# Patient Record
Sex: FEMALE | Race: WHITE
[De-identification: names, ages, dates, MRNs, and addresses within clinical notes are randomized per-mention and may not be internally consistent; named-entity substitution may affect disease eponyms.]

---

## 2017-12-18 NOTE — PCM.PREANE
Preanesthetic Assessment





- Procedure


Proposed Procedure: 





LISA 





- Anesthesia/Transfusion/Family Hx


Anesthesia History: Prior Anesthesia Without Reaction


Family History of Anesthesia Reaction: No


Transfusion History: No Prior Transfusion(s)


Intubation History: Unknown





- Review of Systems


General: No Symptoms


Pulmonary: No Symptoms


Cardiovascular: No Symptoms


Gastrointestinal: Other (GERD)


Neurological: No Symptoms


Other: Reports: None





- Physical Assessment


NPO Status Date: 12/18/17


NPO Status Time: 14:30


Respiratory Rate: 18


Vital Signs: 





 Last Vital Signs











Temp  36.7 C   12/18/17 13:30


 


Pulse  92   12/18/17 13:30


 


Resp  18   12/18/17 13:30


 


BP  120/77   12/18/17 13:30


 


Pulse Ox      











Height: 1.55 m


Weight: 89.176 kg


ASA Class: 2


Mental Status: Alert & Oriented x3


Airway Class: Mallampati = 1


Dentition: Reports: Normal Dentition


Thyro-Mental Finger Breadths: 3


ROM/Head Extension: Full


Lungs: Clear to Auscultation, Normal Respiratory Effort


Cardiovascular: Regular Rate, Regular Rhythm





- Allergies


Allergies/Adverse Reactions: 


 Allergies











Allergy/AdvReac Type Severity Reaction Status Date / Time


 


No Known Allergies Allergy   Verified 09/29/17 19:31














- Blood


Blood Available: No


Product(s) Available: None





- Anesthesia Plan


Pre-Op Medication Ordered: None





- Acknowledgements


Anesthesia Type Planned: Epidural


Pt an Appropriate Candidate for the Planned Anesthesia: Yes


Alternatives and Risks of Anesthesia Discussed w Pt/Guardian: Yes


Pt/Guardian Understands and Agrees with Anesthesia Plan: Yes





PreAnesthesia Questionnaire


Other HEENT History: wears glasses


OB/GYN History: Reports: Pregnancy





- Past Surgical History


HEENT Surgical History: Reports: Oral Surgery, Tonsillectomy





- SUBSTANCE USE


Smoking Status *Q: Never Smoker


Second Hand Smoke Exposure: No


Recreational Drug Use History: No





- HOME MEDS


Home Medications: 


 Home Meds





PNV95/Ferrous Fumarate/FA [Prenatal Tablet] 1 tab PO DAILY 12/18/17 [History]











- CURRENT (IN HOUSE) MEDS


Current Meds: 





 Current Medications





Lactated Ringer's (Ringers, Lactated)  1,000 mls @ 100 mls/hr IV ASDIRECTED DAGO


Oxytocin/Lactated Ringer's (Pitocin In Lr 10 Units/1,000 Ml)  10 unit in 1,000 

mls @ 500 mls/hr IV .CONTINUOUS DAGO


Nalbuphine HCl (Nubain)  10 mg IVPUSH Q2H PRN


   PRN Reason: Pain (moderate 4-6)


Ondansetron HCl (Zofran)  4 mg IVPUSH Q4H PRN


   PRN Reason: Nausea/Vomiting


Sodium Chloride (Saline Flush)  10 ml FLUSH ASDIRECTED PRN


   PRN Reason: Keep Vein Open

## 2017-12-19 NOTE — PCM.SN
- Free Text/Narrative


Note: 





Pushing since noon.


Significant external swelling.


Minimal desent in last 1 hour.


Proceed with .

## 2017-12-19 NOTE — PCM.PNLD
Labor Progress Note





- VS & Meds


Vital Signs: 


 Last Vital Signs











Temp  36.7 C   12/18/17 13:30


 


Pulse  92   12/18/17 13:30


 


Resp  18   12/18/17 14:48


 


BP  120/77   12/18/17 13:30


 


Pulse Ox      











Active Medications: 


 Current Medications





Diphenhydramine HCl (Benadryl)  25 mg IVPUSH Q6H PRN


   PRN Reason: Pruritis


Ephedrine Sulfate (Ephedrine Sulfate)  5 mg IVPUSH ASDIRECTED PRN


   PRN Reason: Hypotension


Fentanyl (Sublimaze)  100 mcg EPIDUR Q3H PRN


   PRN Reason: Pain


   Last Admin: 12/18/17 16:51 Dose:  100 mcg


Fentanyl/Bupivacaine HCl (Fentanyl/Bupivacaine/Ns 2 Mcg-0.125% 100 Ml)  100 ml 

EPIDUR ASDIRECTED DAGO


   Last Admin: 12/19/17 08:15 Dose:  100 ml


Lactated Ringer's (Ringers, Lactated)  1,000 mls @ 100 mls/hr IV ASDIRECTED DAGO


   Last Admin: 12/18/17 17:55 Dose:  999 mls/hr


Oxytocin/Lactated Ringer's (Pitocin In Lr 10 Units/1,000 Ml)  10 unit in 1,000 

mls @ 500 mls/hr IV .CONTINUOUS DAGO


Oxytocin 10 unit/ Lactated (Ringer's)  1,001 mls @ 12.01 mls/hr IV TITRATE DAGO; 

2 MUNITS/MIN


   PRN Reason: Protocol


   Last Titration: 12/19/17 13:01 Dose:  10 munits/min, 60.06 mls/hr


Nalbuphine HCl (Nubain)  10 mg IVPUSH Q2H PRN


   PRN Reason: Pain (moderate 4-6)


Ondansetron HCl (Zofran)  4 mg IVPUSH Q4H PRN


   PRN Reason: Nausea/Vomiting


Ondansetron HCl (Zofran)  4 mg IVPUSH ONETIME PRN


   PRN Reason: Nausea/Vomiting


Sodium Chloride (Saline Flush)  10 ml FLUSH ASDIRECTED PRN


   PRN Reason: Keep Vein Open











- Uterine Contractions


Contraction Intensity: Moderate





- Fetal Monitoring


Fetal Monitor Mode: External Ultrasound


Fetal Heart Rate (FHR) Variability: Moderate (6-25 bmp)


Fetal Strip Review: Category I





- Vaginal Exam


Dilation (cm): 4


Effacement (Percent): 50


Station: -3


Cervical Position: Midposition





- Labor Progress (Free Text)


Labor Progress: 





Term labor. Progressing somewhat slowly. Continue to augment as staffing allows.

## 2017-12-19 NOTE — PCM.OPNOTE
- General Post-Op/Procedure Note


Date of Surgery/Procedure: 17


Operative Procedure(s): primary 


Findings: 





viable male, 9/9 APGARS, 7#5OZ at 1602


Pre Op Diagnosis: failure to descend


Post-Op Diagnosis: Same


Anesthesia Technique: Epidural


Primary Surgeon: Pauline Westfall


Anesthesia Provider: Gilda Khan


Assistant: Nilam Woodruff


Fluid Replacement, Intraop: 800


Output, Urine Amount: 25


EBL in mLs: 900


Complications: None


Condition: Good


Free Text/Narrative:: 


 Intake & Output











 17





 06:59 14:59 22:59


 


Intake Total   1500


 


Balance   1500








The patient was taken to the operating room where epidural anesthesia was dosed 

to surgical levels without difficulty. The patient was prepped and draped in 

the usual sterile fashion in the dorsal supine position with a leftward tilt. A 

Pfannenstiel skin incision was made with the scalpel and carried through to the 

underlying layer of fascia. The fascia was incised in the midline and extended 

laterally using Gatica scissors. Kocher clamps were used to elevate the superior 

aspect of the fascial incision, which was elevated, and the underlying rectus 

muscles were dissected off bluntly and using Gatica scissors. Attention was then 

turned to the inferior aspect of the fascial incision, which in similar fashion 

was grasped with Kocher clamps, elevated, and the underlying rectus muscles 

were dissected off bluntly and using the gatica. The rectus muscles were 

dissected in the midline.





The peritoneum was entered bluntly; this incision was extended superiorly and 

inferiorly with good visualization of the bladder. The bladder blade was 

inserted. The vesicouterine peritoneum was identified and entered sharply using 

Metzenbaum scissors. This incision was extended laterally and the bladder flap 

was created digitally. The bladder blade was reinserted. The lower uterine 

segment was incised in a transverse fashion using the scalpel and with digital 

traction.





Clear fluid was noted. The infant was subsequently delivered by flexing the 

head to the incision. Body and shoulders followed without difficulty.  The cord 

was clamped and cut. The infant was subsequently handed to the awaiting 

pediatrician whose presence had been requested.. The placenta was delivered 

spontaneously intact with a three-vessel cord noted. The uterus was 

exteriorized and cleared of all clots and debris. The uterine incision was 

repaired in 2 layers using 0 monocryl. Hemostasis was visualized.  Hemostasis 

was visualized bilaterally. The uterus was returned to the abdomen. The uterine 

incision was reexamined and it was noted to be hemostatic. The pelvis was 

copiously irrigated. The fascia was closed with 1 PDS suture,  and the skin was 

closed with 3-0 monocryl. Sponge, lap, and instrument counts were correct x2. 

The patient was stable at the completion of the procedure and was subsequently 

transferred to the recovery room in stable condition.

## 2017-12-19 NOTE — PCM.POSTAN
POST ANESTHESIA ASSESSMENT





- MENTAL STATUS


Mental Status: Alert





- VITAL SIGNS


Pulse Rate: 88


SaO2: 97


Resp Rate: 20


Blood Pressure: 99/51


Temperature: 100.3 C





- RESPIRATORY


Respiratory Status: Respiratory Rate WNL, Airway Patent, O2 Saturation Stable





- CARDIOVASCULAR


CV Status: Pulse Rate WNL, Blood Pressure Stable





- GASTROINTESTINAL


GI Status: No Symptoms





- POST OP HYDRATION


Hydration Status: Adequate & Stable

## 2017-12-19 NOTE — PCM.LDHP
L&D History of Present Illness





- General


Date of Service: 17


Admit Problem/Dx: 


 Patient Status Order with Admit Dx/Problem





17 14:31


Patient Status [ADT] Routine 








 Admission Diagnosis/Problem











Admission Diagnosis/Problem    Pregnancy














Source of Information: Patient





- History of Present Illness


Introduction:: 





27 year old  female at 40 1/7 here with SROM


Pain Score: 10


Improves with: Reports: None


Worsens with: Reports: None


Associated Symptoms: Reports: N





- Related Data


Allergies/Adverse Reactions: 


 Allergies











Allergy/AdvReac Type Severity Reaction Status Date / Time


 


No Known Allergies Allergy   Verified 17 19:31











Home Medications: 


 Home Meds





PNV95/Ferrous Fumarate/FA [Prenatal Tablet] 1 tab PO DAILY 17 [History]











Past Medical History


Other HEENT History: wears glasses


OB/GYN History: Reports: Pregnancy





- Past Surgical History


HEENT Surgical History: Reports: Oral Surgery, Tonsillectomy





Social & Family History





- Family History


Family Medical History: Noncontributory





- Tobacco Use


Smoking Status *Q: Never Smoker


Second Hand Smoke Exposure: No





- Recreational Drug Use


Recreational Drug Use: No





H&P Review of Systems





- Review of Systems:


Review Of Systems: See Below


General: Reports: No Symptoms


HEENT: Reports: No Symptoms


Pulmonary: Reports: No Symptoms


Cardiovascular: Reports: No Symptoms


Gastrointestinal: Reports: No Symptoms


Genitourinary: Reports: No Symptoms


Musculoskeletal: Reports: No Symptoms


Skin: Reports: No Symptoms


Psychiatric: Reports: No Symptoms


Neurological: Reports: No Symptoms


Hematologic/Lymphatic: Reports: No Symptoms


Immunologic: Reports: No Symptoms





L&D Exam





- Exam


Exam: See Below





- Vital Signs


Vital Signs: 


 Last Vital Signs











Temp  36.7 C   17 13:30


 


Pulse  92   17 13:30


 


Resp  18   17 14:48


 


BP  120/77   17 13:30


 


Pulse Ox      











Weight: 89.176 kg





- OB Specific


Contraction Intensity: Moderate


Fetal Movement: Active


Fetal Heart Rate (FHR) Variability: Moderate (6-25 bmp)


Birth Presentation: Vertex





- Rapp Score


Rapp Score Cervix Position: Midposition


Rapp Score Consistency: Soft


Rapp Score Effacement: 51-70%


Rapp Score Dilation: 3-4 cm


Rapp Score Infant's Station: -2


Rapp Score Total: 8





- Exam


General: Alert, Oriented


HEENT: PERRLA, Conjunctiva Clear, EACs Clear, EOMI, Hearing Intact, Mucosa 

Moist & Pink, Nares Patent, Normal Nasal Septum, Posterior Pharynx Clear, TMs 

Clear


Neck: Supple, Trachea Midline


Lungs: Clear to Auscultation, Normal Respiratory Effort


Cardiovascular: Regular Rate, Regular Rhythm


GI/Abdominal Exam: Normal Bowel Sounds, Soft, Non-Tender, No Organomegaly, No 

Distention, No Abnormal Bruit, No Mass, Pelvis Stable


Rectal Exam: Normal Exam, Normal Rectal Tone


Genitourinary: Normal external exam, Normal bimanual exam, Normal speculum exam


Back Exam: Normal Inspection, Full Range of Motion


Extremities: Normal Inspection, Normal Range of Motion, Non-Tender, No Pedal 

Edema, Normal Capillary Refill


Skin: Warm, Dry, Intact


Neurological: Cranial Nerves Intact, Reflexes Equal Bilateral


Psychiatric: Alert, Normal Affect, Normal Mood





- Patient Data


Lab Results Last 24 hrs: 


 Laboratory Results - last 24 hr











  17 Range/Units





  15:00 


 


WBC  15.64 H  (3.98-10.04)  K/mm3


 


RBC  4.49  (3.98-5.22)  M/mm3


 


Hgb  13.3  (11.2-15.7)  gm/L


 


Hct  39.2  (34.1-44.9)  %


 


MCV  87.3  (79.4-94.8)  fl


 


MCH  29.6  (25.6-32.2)  pg


 


MCHC  33.9  (32.2-35.5)  g/dl


 


RDW Std Deviation  43.6  (36.4-46.3)  fL


 


Plt Count  281  (182-369)  K/mm3


 


MPV  9.9  (9.4-12.3)  fl


 


Neut % (Auto)  78.6 H  (34.0-71.1)  %


 


Lymph % (Auto)  12.4 L  (19.3-51.7)  %


 


Mono % (Auto)  6.5  (4.7-12.5)  %


 


Eos % (Auto)  2.0  (0.7-5.8)  


 


Baso % (Auto)  0.1  (0.1-1.2)  %


 


Neut # (Auto)  12.28 H  (1.56-6.13)  K/mm3


 


Lymph # (Auto)  1.94  (1.18-3.74)  K/mm3


 


Mono # (Auto)  1.02 H  (0.24-0.36)  K/mm3


 


Eos # (Auto)  0.31  (0.04-0.36)  K/mm3


 


Baso # (Auto)  0.02  (0.01-0.08)  K/mm3











Result Diagrams: 


 17 15:00





Problem List Initiated/Reviewed/Updated: Yes


Orders Last 24hrs: 


 Active Orders 24 hr











 Category Date Time Status


 


 Patient Status [ADT] Routine ADT  17 14:31 Active


 


 Activity as Tolerated [RC] PFP Care  17 14:31 Active


 


 Communication Order [RC] ASDIRECTED Care  17 14:31 Active


 


 Fetal Heart Tones [RC] ASDIRECTED Care  17 14:32 Active


 


 Notify Provider [RC] PFP Care  17 14:31 Active


 


 Notify Provider [RC] PRN Care  17 14:31 Active


 


 Peripheral IV Care [RC] .AS DIRECTED Care  17 14:32 Active


 


 Pump Management, Intrathecal [RC] ASDIRECTED Care  17 14:31 Active


 


 Urinary Catheter Assessment [RC] ASDIRECTED Care  17 14:31 Active


 


 Vital Signs [RC] PER UNIT ROUTINE Care  17 14:31 Active


 


 Bupivacaine/fentaNYL/NS [fentaNYL/Bupivacaine/NS 2 MCG- Med  17 15:30 

Active





 0.125% 100 ML]   





 100 ml EPIDUR ASDIRECTED   


 


 Lactated Ringers [Ringers, Lactated] 1,000 ml Med  17 14:45 Active





 IV ASDIRECTED   


 


 Nalbuphine [Nubain] Med  17 14:31 Active





 10 mg IVPUSH Q2H PRN   


 


 Ondansetron [Zofran] Med  17 15:21 Active





 4 mg IVPUSH ONETIME PRN   


 


 Ondansetron [Zofran] Med  17 14:31 Active





 4 mg IVPUSH Q4H PRN   


 


 Oxytocin [Pitocin] 10 unit Med  17 19:15 Active





 Lactated Ringers [Ringers, Lactated] 1,000 ml   





 IV TITRATE   


 


 Oxytocin/Lactated Ringers [Pitocin in LR 10 Units/1,000 Med  17 14:45 

Active





 ML]   





 10 unit in 1,000 ml IV .CONTINUOUS   


 


 Sodium Chloride 0.9% [Saline Flush] Med  17 14:31 Active





 10 ml FLUSH ASDIRECTED PRN   


 


 diphenhydrAMINE [Benadryl] Med  17 15:21 Active





 25 mg IVPUSH Q6H PRN   


 


 ePHEDrine [ePHEDrine Sulfate] Med  17 15:21 Active





 5 mg IVPUSH ASDIRECTED PRN   


 


 fentaNYL [Sublimaze] Med  17 15:21 Active





 100 mcg EPIDUR Q3H PRN   


 


 Electronic Fetal Heart Tones Ext w TOCO [WOMSER] Oth  17 14:31 Ordered





 Routine   


 


 Electronic Fetal Heart Tones Internal [WOMSER] Per Unit Oth  17 14:31 

Ordered





 Routine   


 


 Peripheral IV Insertion Adult [OM.PC] Routine Ot  17 14:31 Ordered


 


 Resuscitation Status Routine Resus Stat  17 14:31 Ordered








 Medication Orders





Diphenhydramine HCl (Benadryl)  25 mg IVPUSH Q6H PRN


   PRN Reason: Pruritis


Ephedrine Sulfate (Ephedrine Sulfate)  5 mg IVPUSH ASDIRECTED PRN


   PRN Reason: Hypotension


Fentanyl (Sublimaze)  100 mcg EPIDUR Q3H PRN


   PRN Reason: Pain


   Last Admin: 17 16:51  Dose: 100 mcg


Fentanyl/Bupivacaine HCl (Fentanyl/Bupivacaine/Ns 2 Mcg-0.125% 100 Ml)  100 ml 

EPIDUR ASDIRECTED [facility]


   Last Admin: 17 08:15  Dose: 100 ml


   Admin: 17 22:54  Dose: 100 ml


   Admin: 17 16:51  Dose: 100 ml


Lactated Ringer's (Ringers, Lactated)  1,000 mls @ 100 mls/hr IV ASDIRECTED [facility]


   Last Admin: 17 17:55  Dose: 999 mls/hr


   Infusion: 17 17:39  Dose: 999 mls/hr


   Admin: 17 16:38  Dose: 999 mls/hr


   Infusion: 17 16:38  Dose: 999 mls/hr


   Admin: 17 15:56  Dose: 999 mls/hr


Oxytocin/Lactated Ringer's (Pitocin In Lr 10 Units/1,000 Ml)  10 unit in 1,000 

mls @ 500 mls/hr IV .CONTINUOUS DAGO


Oxytocin 10 unit/ Lactated (Ringer's)  1,001 mls @ 12.01 mls/hr IV TITRATE DAGO; 

2 MUNITS/MIN


   PRN Reason: Protocol


   Last Titration: 17 13:01  Dose: 10 munits/min, 60.06 mls/hr


   Titration: 17 11:13  Dose: 9 munits/min, 54.05 mls/hr


   Titration: 17 09:37  Dose: 7 munits/min, 42.04 mls/hr


   Titration: 17 08:16  Dose: 5 munits/min, 30.03 mls/hr


   Titration: 17 19:53  Dose: 4 munits/min, 24.02 mls/hr


   Admin: 17 19:00  Dose: 2 munits/min, 12.01 mls/hr


Nalbuphine HCl (Nubain)  10 mg IVPUSH Q2H PRN


   PRN Reason: Pain (moderate 4-6)


Ondansetron HCl (Zofran)  4 mg IVPUSH Q4H PRN


   PRN Reason: Nausea/Vomiting


Ondansetron HCl (Zofran)  4 mg IVPUSH ONETIME PRN


   PRN Reason: Nausea/Vomiting


Sodium Chloride (Saline Flush)  10 ml FLUSH ASDIRECTED PRN


   PRN Reason: Keep Vein Open








Assessment/Plan Comment:: 





Term labor.


Augment as needed with pitocin. 


Anticipate  unless otherwise indicated.


AROM of forebag.

## 2017-12-26 NOTE — PCM.DCSUM1
**Discharge Summary





- Discharge Data


Discharge Date: 17


Discharge Disposition: Home, Self-Care 01


Condition: Good





- Patient Summary/Data


Operative Procedure(s) Performed: primary 





- Patient Instructions


Diet: Usual Diet as Tolerated


Activity: No Strenuous Activities


Driving: May Drive Today


Showering/Bathing: May Shower


Wound/Incision Care: Keep Operative Site/Wound Site Clean and Dry


Notify Provider of: Fever, Increased Pain, Swelling and Redness, Drainage, 

Nausea and/or Vomiting





- Discharge Plan


Prescriptions/Med Rec: 


Acetaminophen/oxyCODONE [Percocet 325-5 MG] 1 - 2 tab PO Q4H PRN #30 tablet


 PRN Reason: Pain


Home Medications: 


 Home Meds





PNV95/Ferrous Fumarate/FA [Prenatal Tablet] 1 tab PO DAILY 17 [History]


Acetaminophen/oxyCODONE [Percocet 325-5 MG] 1 - 2 tab PO Q4H PRN #30 tablet  [Rx]


Docusate Sodium [Colace] 100 mg PO Q12H PRN  cap 17 [Rx]


Ibuprofen [IJD: Ibuprofen] 600 mg PO Q6H PRN  tablet 17 [Rx]


Lanolin [Lansinoh HPA] 1 applic TOP ASDIRECTED PRN  tube 17 [Rx]








Patient Handouts:  Home Care Instructions for Mom


Referrals: 


Pauline Westfall MD [Primary Care Provider] -  (2-3 weeks)





- General Info


Date of Service: 17


Functional Status: Reports: Pain Controlled





- Review of Systems


General: Reports: No Symptoms


HEENT: Reports: No Symptoms


Pulmonary: Reports: No Symptoms


Cardiovascular: Reports: No Symptoms


Gastrointestinal: Reports: No Symptoms


Genitourinary: Reports: No Symptoms


Musculoskeletal: Reports: No Symptoms


Skin: Reports: No Symptoms


Neurological: Reports: No Symptoms


Psychiatric: Reports: No Symptoms





- Patient Data


Vitals - Most Recent: 


 Last Vital Signs











Temp  36.9 C   17 12:07


 


Pulse  100   17 12:07


 


Resp  19   17 12:07


 


BP  117/64   17 12:07


 


Pulse Ox  95   17 12:07











Weight - Most Recent: 89.176 kg


I&O - Last 24 hours: 


 Intake & Output











 17/17





 22:59 06:59 14:59


 


Intake Total   800


 


Output Total   25


 


Balance   775











Med Orders - Current: 


 Current Medications








Discontinued Medications





Bupivacaine HCl (Marcaine 0.5%) Confirm Administered Dose 30 ml .ROUTE .STK-MED 

ONE


   Stop: 17 15:05


   Last Admin: 17 15:58 Dose:  20 ml


Bupivacaine HCl (Sensorcaine-Mpf 0.25%)  10 ml .ROUTE .STK-MED ONE


   Stop: 17 22:23


Cefazolin Sodium (Ancef) Confirm Administered Dose 2 gm .ROUTE .STK-MED ONE


   Stop: 17 15:34


Citric Acid/Sodium Citrate (Bicitra Solution) Confirm Administered Dose 30 ml 

.ROUTE .STK-MED ONE


   Stop: 17 15:27


   Last Admin: 17 15:30 Dose:  30 ml


Citric Acid/Sodium Citrate (Bicitra Solution)  30 ml PO ONETIME ONE


   Stop: 17 15:31


   Last Admin: 17 15:22 Dose:  Not Given


Diphenhydramine HCl (Benadryl)  25 mg IVPUSH Q6H PRN


   PRN Reason: Pruritis


Diphenhydramine HCl (Benadryl)  25 mg IVPUSH Q6H PRN


   PRN Reason: pruritis


Diphenhydramine HCl (Benadryl)  25 mg IVPUSH Q6H PRN


   PRN Reason: Itching or Nausea


Docusate Sodium (Colace)  100 mg PO Q12H PRN


   PRN Reason: Constipation


   Last Admin: 17 02:52 Dose:  100 mg


Emollient Ointment (Lansinoh Hpa)  0 gm TOP ASDIRECTED PRN


   PRN Reason: Sore Nipples


   Last Admin: 17 18:18 Dose:  1 applic


Ephedrine Sulfate (Ephedrine Sulfate)  5 mg IVPUSH ASDIRECTED PRN


   PRN Reason: Hypotension


Ephedrine Sulfate (Ephedrine Sulfate)  5 mg IVPUSH SEECOMMENT PRN


   PRN Reason: Other


   Last Admin: 17 21:01 Dose:  5 mg


Fentanyl (Sublimaze)  100 mcg EPIDUR Q3H PRN


   PRN Reason: Pain


   Last Admin: 17 16:51 Dose:  100 mcg


Fentanyl (Sublimaze) Confirm Administered Dose 100 mcg .ROUTE .STK-MED ONE


   Stop: 17 15:34


Fentanyl (Sublimaze)  50 mcg IVPUSH Q5M PRN


   PRN Reason: Pain


Fentanyl/Bupivacaine HCl (Fentanyl/Bupivacaine/Ns 2 Mcg-0.125% 100 Ml)  100 ml 

EPIDUR ASDIRECTED [facility]


   Last Admin: 17 08:15 Dose:  100 ml


Hydromorphone HCl (Dilaudid)  0.5 mg IVPUSH Q15M PRN


   PRN Reason: severe pain


Lactated Ringer's (Ringers, Lactated)  1,000 mls @ 100 mls/hr IV ASDIRECTED [facility]


   Last Admin: 17 21:14 Dose:  999 mls/hr


Oxytocin/Lactated Ringer's (Pitocin In Lr 10 Units/1,000 Ml)  10 unit in 1,000 

mls @ 500 mls/hr IV .CONTINUOUS [facility]


Oxytocin 10 unit/ Lactated (Ringer's)  1,001 mls @ 12.01 mls/hr IV TITRATE DAGO; 

2 MUNITS/MIN


   PRN Reason: Protocol


   Last Titration: 17 13:01 Dose:  10 munits/min, 60.06 mls/hr


Lactated Ringer's (Ringers, Lactated) Confirm Administered Dose 1,000 mls @ as 

directed .ROUTE .[facility] ONE


   Stop: 17 15:34


Cefazolin Sodium/Dextrose 2 gm (/ Premix)  50 mls @ 100 mls/hr IV ONETIME ONE


   Stop: 17 15:59


   Last Admin: 17 15:21 Dose:  Not Given


Oxytocin/Lactated Ringer's (Pitocin In Lr 10 Units/1,000 Ml)  10 unit in 1,000 

mls @ 100 mls/hr IV ASDIRECTED [facility]


   PRN Reason: Protocol


Phenylephrine HCl 1 mg/ Sodium (Chloride)  10.1 mls @ 1 mls/sec IV TITRATE DAGO


   PRN Reason: Protocol


Dextrose/Lactated Ringer's (Dextrose 5%-Lactated Ringers)  1,000 mls @ 125 mls/

hr IV ASDIRECTED [facility]


   Stop: 17 01:21


   Last Admin: 17 18:03 Dose:  125 mls/hr


Ibuprofen (Motrin)  600 mg PO Q6H PRN


   PRN Reason: mild pain or fever


   Last Admin: 17 11:03 Dose:  600 mg


Ketorolac Tromethamine (Toradol) Confirm Administered Dose 30 mg .ROUTE .STK-

MED ONE


   Stop: 17 15:34


Ketorolac Tromethamine (Toradol)  30 mg IVPUSH Q6H [facility]


   Stop: 17 05:23


   Last Admin: 17 15:20 Dose:  Not Given


Ketorolac Tromethamine (Toradol)  30 mg IVPUSH Q6H [facility]


   Stop: 17 10:01


   Last Admin: 17 11:52 Dose:  Not Given


Lidocaine/Epinephrine (Xylocaine-Mpf 2%-Epi 1:200,000) Confirm Administered 

Dose 20 ml .ROUTE .STK-MED ONE


   Stop: 17 15:34


Meperidine HCl (Demerol)  12.5 mg IVPUSH ONETIME PRN


   PRN Reason: shivering


Metoclopramide HCl (Reglan) Confirm Administered Dose 10 mg .ROUTE .STK-MED ONE


   Stop: 17 15:27


   Last Admin: 17 15:30 Dose:  10 mg


Metoclopramide HCl (Reglan)  10 mg IVPUSH ONETIME ONE


   Stop: 17 15:31


   Last Admin: 17 15:22 Dose:  Not Given


Morphine Sulfate (Duramorph Pf) Confirm Administered Dose 1 mg .ROUTE .STK-MED 

ONE


   Stop: 17 15:05


Nalbuphine HCl (Nubain)  10 mg IVPUSH Q2H PRN


   PRN Reason: Pain (moderate 4-6)


Naloxone HCl (Narcan)  0.1 mg IVPUSH SEECOMMENT PRN


   PRN Reason: Respiratory Depression


Ondansetron HCl (Zofran)  4 mg IVPUSH Q4H PRN


   PRN Reason: Nausea/Vomiting


Ondansetron HCl (Zofran)  4 mg IVPUSH ONETIME PRN


   PRN Reason: Nausea/Vomiting


Ondansetron HCl (Zofran) Confirm Administered Dose 4 mg .ROUTE .STK-MED ONE


   Stop: 17 15:34


Ondansetron HCl (Zofran)  4 mg IVPUSH ONETIME PRN


   PRN Reason: Nausea/Vomiting


Oxycodone/Acetaminophen (Percocet 325-5 Mg)  1 - 2 tab PO Q4H PRN


   PRN Reason: Pain


   Last Admin: 17 11:56 Dose:  2 tab


Oxytocin (Pitocin) Confirm Administered Dose 10 unit .ROUTE .STK-MED ONE


   Stop: 17 15:34


Phenylephrine HCl (Leeroy-Synephrine) Confirm Administered Dose 10 mg .ROUTE .STK-

MED ONE


   Stop: 17 15:34


Simethicone (Simethicone)  80 mg PO Q4H PRN


   PRN Reason: gas pains


   Last Admin: 17 11:45 Dose:  80 mg


Sodium Chloride (Saline Flush)  10 ml FLUSH ASDIRECTED PRN


   PRN Reason: Keep Vein Open











- Exam


General: Reports: Alert, Oriented


HEENT: Reports: Pupils Equal, Pupils Reactive, EOMI, Mucous Membr. Moist/Pink


Neck: Reports: Supple


Lungs: Reports: Clear to Auscultation, Normal Respiratory Effort


Cardiovascular: Reports: Regular Rate, Regular Rhythm


GI/Abdominal Exam: Normal Bowel Sounds, Soft, Non-Tender, No Organomegaly, No 

Distention, No Abnormal Bruit, No Mass, Pelvis Stable


 (Female) Exam: Normal External Exam, Normal Speculum Exam, Normal Bimanual 

Exam


Back Exam: Reports: Normal Inspection, Full Range of Motion


Extremities: Normal Inspection, Normal Range of Motion, Non-Tender, No Pedal 

Edema, Normal Capillary Refill


Skin: Reports: Warm, Dry, Intact


Wound/Incisions: Reports: Healing Well


Neurological: Reports: No New Focal Deficit


Psy/Mental Status: Reports: Alert, Normal Affect, Normal Mood





*Q Meaningful Use (DIS)





- VTE *Q


VTE Criteria *Q: 








- Stroke *Q


Stroke Criteria *Q: 








- AMI *Q


AMI Criteria *Q:

## 2020-03-17 ENCOUNTER — HOSPITAL ENCOUNTER (INPATIENT)
Dept: HOSPITAL 41 - JD.OB | Age: 30
LOS: 1 days | Discharge: HOME | DRG: 540 | End: 2020-03-18
Attending: OBSTETRICS & GYNECOLOGY | Admitting: OBSTETRICS & GYNECOLOGY
Payer: COMMERCIAL

## 2020-03-17 DIAGNOSIS — Z79.899: ICD-10-CM

## 2020-03-17 DIAGNOSIS — O34.211: Primary | ICD-10-CM

## 2020-03-17 DIAGNOSIS — Z3A.38: ICD-10-CM

## 2020-03-17 DIAGNOSIS — Z90.89: ICD-10-CM

## 2020-03-17 DIAGNOSIS — Z91.09: ICD-10-CM

## 2020-03-17 RX ADMIN — OXYCODONE HYDROCHLORIDE AND ACETAMINOPHEN PRN TAB: 5; 325 TABLET ORAL at 21:17

## 2020-03-17 RX ADMIN — OXYCODONE HYDROCHLORIDE AND ACETAMINOPHEN PRN TAB: 5; 325 TABLET ORAL at 13:49

## 2020-03-17 NOTE — PCM.POSTAN
POST ANESTHESIA ASSESSMENT





- MENTAL STATUS


Mental Status: Alert, Oriented





- VITAL SIGNS


Vital Signs: 


 Last Vital Signs











Temp  36.5 C   03/17/20 06:16


 


Pulse  78   03/17/20 06:16


 


Resp  14   03/17/20 06:16


 


BP  99/59 L  03/17/20 06:16


 


Pulse Ox  97   03/17/20 06:16








0840


109/44


69


16


99%


97.3F





- RESPIRATORY


Respiratory Status: Respiratory Rate WNL, Airway Patent, O2 Saturation Stable, 

Supplemental Oxygen





- CARDIOVASCULAR


CV Status: Pulse Rate WNL, Blood Pressure Stable





- GASTROINTESTINAL


GI Status: No Symptoms





- PAIN


Pain Score: 0





- POST OP HYDRATION


Hydration Status: Adequate & Stable

## 2020-03-17 NOTE — PCM.PREANE
Preanesthetic Assessment





- Anesthesia/Transfusion/Family Hx


Anesthesia History: Prior Anesthesia Without Reaction


Family History of Anesthesia Reaction: No


Transfusion History: No Prior Transfusion(s)


Intubation History: Unknown





- Review of Systems


General: No Symptoms


Pulmonary: No Symptoms


Cardiovascular: No Symptoms


Gastrointestinal: No Symptoms


Neurological: No Symptoms


Other: Reports: None





- Physical Assessment


NPO Status Date: 20


NPO Status Time: 21:30


Vital Signs: 





 Last Vital Signs











Temp  36.5 C   20 06:16


 


Pulse  78   20 06:16


 


Resp  14   20 06:16


 


BP  99/59 L  20 06:16


 


Pulse Ox  97   20 06:16











Height: 1.55 m


Weight: 78.925 kg


ASA Class: 2


Mental Status: Alert & Oriented x3


Airway Class: Mallampati = 1


Dentition: Reports: Normal Dentition


Thyro-Mental Finger Breadths: 3


Mouth Opening Finger Breadths: 3


ROM/Head Extension: Full


Lungs: Clear to Auscultation, Normal Respiratory Effort


Cardiovascular: Regular Rate, Regular Rhythm





- Lab


Values: 





 Laboratory Last Values











WBC  7.87 K/mm3 (3.98-10.04)   20  05:37    


 


RBC  4.23 M/mm3 (3.98-5.22)   20  05:37    


 


Hgb  11.8 gm/dl (11.2-15.7)  D 20  05:37    


 


Hct  36.9 % (34.1-44.9)   20  05:37    


 


MCV  87.2 fl (79.4-94.8)   20  05:37    


 


MCH  27.9 pg (25.6-32.2)   20  05:37    


 


MCHC  32.0 g/dl (32.2-35.5)  L  20  05:37    


 


RDW Std Deviation  43.9 fL (36.4-46.3)   20  05:37    


 


Plt Count  255 K/mm3 (182-369)   20  05:37    


 


MPV  9.6 fl (9.4-12.3)   20  05:37    


 


Neut % (Auto)  64.0 % (34.0-71.1)   20  05:37    


 


Lymph % (Auto)  26.0 % (19.3-51.7)   20  05:37    


 


Mono % (Auto)  7.1 % (4.7-12.5)   20  05:37    


 


Eos % (Auto)  2.2  (0.7-5.8)   20  05:37    


 


Baso % (Auto)  0.3 % (0.1-1.2)   20  05:37    


 


Neut # (Auto)  5.04 K/mm3 (1.56-6.13)   20  05:37    


 


Lymph # (Auto)  2.05 K/mm3 (1.18-3.74)   20  05:37    


 


Mono # (Auto)  0.56 K/mm3 (0.24-0.36)  H  20  05:37    


 


Eos # (Auto)  0.17 K/mm3 (0.04-0.36)   20  05:37    


 


Baso # (Auto)  0.02 K/mm3 (0.01-0.08)   20  05:37    














- Allergies


Allergies/Adverse Reactions: 


 Allergies











Allergy/AdvReac Type Severity Reaction Status Date / Time


 


No Known Allergies Allergy   Verified 17 19:31














- Acknowledgements


Anesthesia Type Planned: Spinal


Pt an Appropriate Candidate for the Planned Anesthesia: Yes


Alternatives and Risks of Anesthesia Discussed w Pt/Guardian: Yes


Pt/Guardian Understands and Agrees with Anesthesia Plan: Yes





PreAnesthesia Questionnaire


Other HEENT History: wears glasses


OB/GYN History: Reports: Pregnancy, Spontaneous 





- Infectious Disease History


Infectious Disease History: Reports: MRSA


Other Infectious Disease History: Hx of MRSA- cleared by Dr. Westfall 10/04/2019

- See scanned Culture and PCR results.





- Past Surgical History


HEENT Surgical History: Reports: Oral Surgery, Tonsillectomy


Female  Surgical History: Reports:  Section, D&C





- SUBSTANCE USE


Smoking Status *Q: Never Smoker


Tobacco Use Within Last Twelve Months: No


Second Hand Smoke Exposure: No


Recreational Drug Use History: No





- HOME MEDS


Home Medications: 


 Home Meds





Pnv No.95/Ferrous Fum/Folic AC [Prenatal Tablet] 1 tab PO DAILY 17 [

History]


Acetaminophen/oxyCODONE [Percocet 325-5 MG] 1 - 2 tab PO Q4H PRN #30 tablet  [Rx]


Docusate Sodium [Colace] 100 mg PO Q12H PRN  cap 17 [Rx]


Ibuprofen [IJD: Ibuprofen] 600 mg PO Q6H PRN  tablet 17 [Rx]


Lanolin [Lansinoh HPA] 1 applic TOP ASDIRECTED PRN  tube 17 [Rx]











- CURRENT (IN HOUSE) MEDS


Current Meds: 





 Current Medications





Diphenhydramine HCl (Benadryl)  25 mg IVPUSH Q6H PRN


   PRN Reason: Pruritis


Fentanyl (Sublimaze)  50 mcg IVPUSH Q5M PRN


   PRN Reason: Pain


Lactated Ringer's (Ringers, Lactated)  1,000 mls @ 125 mls/hr IV ASDIRECTED DAGO


   Last Admin: 20 06:46 Dose:  125 mls/hr


Oxytocin/Lactated Ringer's (Pitocin In Lr 10 Units/1,000 Ml)  10 unit in 1,000 

mls @ 100 mls/hr IV ASDIRECTED DAGO; Protocol


Ondansetron HCl (Zofran)  4 mg IVPUSH ONETIME PRN


   PRN Reason: Nausea/Vomiting


Sodium Chloride (Saline Flush)  10 ml FLUSH ASDIRECTED PRN


   PRN Reason: Keep Vein Open





Discontinued Medications





Bupivacaine HCl (Marcaine 0.5%) Confirm Administered Dose 30 ml .ROUTE .STK-MED 

ONE


   Stop: 20 07:15


Cefazolin Sodium (Ancef) Confirm Administered Dose 2 gm .ROUTE .STK-MED ONE


   Stop: 20 07:17


Citric Acid/Sodium Citrate (Bicitra Solution)  30 ml PO ONETIME ONE


   Stop: 20 06:01


   Last Admin: 20 06:36 Dose:  30 ml


Cefazolin Sodium/Dextrose 2 gm (/ Premix)  50 mls @ 100 mls/hr IV ONETIME ONE


   Stop: 20 06:59


Lactated Ringer's (Ringers, Lactated) Confirm Administered Dose 2,000 mls @ as 

directed .ROUTE .STK-MED ONE


   Stop: 20 07:17


Ketorolac Tromethamine (Toradol) Confirm Administered Dose 30 mg .ROUTE .STK-

MED ONE


   Stop: 20 07:17


Metoclopramide HCl (Reglan)  10 mg IVPUSH ONETIME ONE


   Stop: 20 06:01


   Last Admin: 20 06:37 Dose:  10 mg


Miscellaneous Medication (Phenylephrine 1 Mg/10 Ml-Ns) Confirm Administered 

Dose 1 mg IV .STK-MED ONE


   Stop: 20 07:53


Morphine Sulfate (Duramorph Pf) Confirm Administered Dose 1 mg .ROUTE .STK-MED 

ONE


   Stop: 20 07:18


Ondansetron HCl (Zofran) Confirm Administered Dose 4 mg .ROUTE .STK-MED ONE


   Stop: 20 07:17


Oxytocin (Pitocin) Confirm Administered Dose 20 unit .ROUTE .STK-MED ONE


   Stop: 20 07:17

## 2020-03-17 NOTE — PCM.OPNOTE
- General Post-Op/Procedure Note


Date of Surgery/Procedure: 20


Operative Procedure(s): repeat  section


Findings: 





Normal pelvic anatomy


Pre Op Diagnosis: prior  desires repeat


Post-Op Diagnosis: Same


Anesthesia Technique: Spinal


Primary Surgeon: Pauline Westfall


Anesthesia Provider: Annette Hernandez


Assistant: Nilam Woodruff


Fluid Replacement, Intraop: 2,000


Output, Urine Amount: 100


EBL in mLs: 600


Complications: None


Condition: Good


Free Text/Narrative:: 


 Intake & Output











 20





 22:59 06:59 14:59


 


Intake Total  1000 


 


Balance  1000 








The patient was taken to the operating room where spinal anesthesia was dosed 

to surgical levels without difficulty. The patient was prepped and draped in 

the usual sterile fashion in the dorsal supine position with a leftward tilt. A 

Pfannenstiel skin incision was made with the scalpel and carried through to the 

underlying layer of fascia. The fascia was incised in the midline and extended 

laterally using Gatica scissors. Kocher clamps were used to elevate the superior 

aspect of the fascial incision, which was elevated, and the underlying rectus 

muscles were dissected off bluntly and using Gatica scissors. Attention was then 

turned to the inferior aspect of the fascial incision, which in similar fashion 

was grasped with Kocher clamps, elevated, and the underlying rectus muscles 

were dissected off bluntly and using the gatica. The rectus muscles were 

dissected in the midline.





The peritoneum was entered bluntly; this incision was extended superiorly and 

inferiorly with good visualization of the bladder. The bladder blade was 

inserted. The vesicouterine peritoneum was identified and entered sharply using 

Metzenbaum scissors. This incision was extended laterally and the bladder flap 

was created digitally. The bladder blade was reinserted. The lower uterine 

segment was incised in a transverse fashion using the scalpel and with digital 

traction.





Clear fluid was noted. The infant was subsequently delivered by flexing the 

head to the incision. Body and shoulders followed without difficulty.  The cord 

was clamped and cut. The infant was subsequently handed to the awaiting 

pediatrician whose presence had been requested.. The placenta was delivered 

spontaneously intact with a three-vessel cord noted. The uterus was 

exteriorized and cleared of all clots and debris. The uterine incision was 

repaired in 1 layer with 3 additional figure of 8 stitches using 0 monocryl. 

Hemostasis was visualized.  Hemostasis was visualized bilaterally. The uterus 

was returned to the abdomen. The uterine incision was reexamined and it was 

noted to be hemostatic. The pelvis was copiously irrigated. The fascia was 

closed with 1 PDS suture,  and the skin was closed with 3-0 monocryl. Sponge, 

lap, and instrument counts were correct x2. The patient was stable at the 

completion of the procedure and was subsequently transferred to the recovery 

room in stable condition.

## 2020-03-18 VITALS — SYSTOLIC BLOOD PRESSURE: 89 MMHG | HEART RATE: 68 BPM | DIASTOLIC BLOOD PRESSURE: 54 MMHG

## 2020-03-18 RX ADMIN — OXYCODONE HYDROCHLORIDE AND ACETAMINOPHEN PRN TAB: 5; 325 TABLET ORAL at 00:39

## 2020-03-18 RX ADMIN — OXYCODONE HYDROCHLORIDE AND ACETAMINOPHEN PRN TAB: 5; 325 TABLET ORAL at 04:49

## 2020-03-18 RX ADMIN — OXYCODONE HYDROCHLORIDE AND ACETAMINOPHEN PRN TAB: 5; 325 TABLET ORAL at 11:55

## 2020-03-18 NOTE — PCM.DCSUM1
**Discharge Summary





- Hospital Course


Diagnosis: Stroke: No





- Discharge Data


Discharge Date: 20


Discharge Disposition: Home, Self-Care 01


Condition: Good





- Referral to Home Health


Primary Care Physician: 


Pauline Westfall MD








- Patient Summary/Data


Operative Procedure(s) Performed: repeat  section





- Patient Instructions


Diet: Usual Diet as Tolerated


Activity: No Strenuous Activities


Driving: Do Not Drive


Notify Provider of: Fever, Increased Pain, Swelling and Redness, Drainage, 

Nausea and/or Vomiting





- Discharge Plan


*PRESCRIPTION DRUG MONITORING PROGRAM REVIEWED*: No


*COPY OF PRESCRIPTION DRUG MONITORING REPORT IN PATIENT LIDIA: No


Home Medications: 


 Home Meds





Pnv No.95/Ferrous Fum/Folic AC [Prenatal Tablet] 1 tab PO DAILY 17 [

History]


Acetaminophen/oxyCODONE [Percocet 325-5 MG] 1 - 2 tab PO Q4H PRN #30 tablet  [Rx]


Docusate Sodium [Colace] 100 mg PO Q12H PRN  cap 17 [Rx]


Ibuprofen [IJD: Ibuprofen] 600 mg PO Q6H PRN  tablet 17 [Rx]


Lanolin [Lansinoh HPA] 1 applic TOP ASDIRECTED PRN  tube 17 [Rx]








Referrals: 


Pauline Westfall MD [Primary Care Provider] -  (See us when/if needed 

when back with baby for appt with Dr. Perry at 2-4 weeks)





- Discharge Summary/Plan Comment


DC Time >30 min.: No





- General Info


Date of Service: 20


Functional Status: Reports: Pain Controlled





- Review of Systems


General: Reports: No Symptoms


HEENT: Reports: No Symptoms


Pulmonary: Reports: No Symptoms


Cardiovascular: Reports: No Symptoms


Gastrointestinal: Reports: No Symptoms


Genitourinary: Reports: No Symptoms


Musculoskeletal: Reports: No Symptoms


Skin: Reports: No Symptoms


Neurological: Reports: No Symptoms


Psychiatric: Reports: No Symptoms





- Patient Data


Vitals - Most Recent: 


 Last Vital Signs











Temp  37.1 C   20 07:52


 


Pulse  68   20 07:52


 


Resp  16   20 07:52


 


BP  89/54 L  20 07:52


 


Pulse Ox  99   20 07:52











Weight - Most Recent: 78.925 kg


I&O - Last 24 hours: 


 Intake & Output











 20





 22:59 06:59 14:59


 


Intake Total 200  300


 


Output Total 3400 2550 


 


Balance -3200 -2550 300











Lab Results - Last 24 hrs: 


 Laboratory Results - last 24 hr











  20 Range/Units





  05:37 05:50 


 


WBC   11.98 H  (3.98-10.04)  K/mm3


 


RBC   3.61 L  (3.98-5.22)  M/mm3


 


Hgb   10.1 L D  (11.2-15.7)  gm/dl


 


Hct   32.0 L  (34.1-44.9)  %


 


MCV   88.6  (79.4-94.8)  fl


 


MCH   28.0  (25.6-32.2)  pg


 


MCHC   31.6 L  (32.2-35.5)  g/dl


 


RDW Std Deviation   44.4  (36.4-46.3)  fL


 


Plt Count   218  (182-369)  K/mm3


 


MPV   9.4  (9.4-12.3)  fl


 


Neut % (Auto)   79.8 H  (34.0-71.1)  %


 


Lymph % (Auto)   11.5 L  (19.3-51.7)  %


 


Mono % (Auto)   7.7  (4.7-12.5)  %


 


Eos % (Auto)   0.7  (0.7-5.8)  


 


Baso % (Auto)   0.1  (0.1-1.2)  %


 


Neut # (Auto)   9.57 H  (1.56-6.13)  K/mm3


 


Lymph # (Auto)   1.38  (1.18-3.74)  K/mm3


 


Mono # (Auto)   0.92 H  (0.24-0.36)  K/mm3


 


Eos # (Auto)   0.08  (0.04-0.36)  K/mm3


 


Baso # (Auto)   0.01  (0.01-0.08)  K/mm3


 


RPR  Non-reactive   (NONREACTIVE)  











Med Orders - Current: 


 Current Medications





Diphenhydramine HCl (Benadryl)  25 mg IVPUSH Q6H PRN


   PRN Reason: Itching or Nausea


Ephedrine Sulfate (Ephedrine Sulfate)  5 mg IVPUSH SEECOMMENT PRN


   PRN Reason: Other


Ibuprofen (Motrin)  600 mg PO Q6H PRN


   PRN Reason: Pain


   Last Admin: 20 09:17 Dose:  600 mg


Naloxone HCl (Narcan)  0.1 mg IVPUSH SEECOMMENT PRN


   PRN Reason: Respiratory Depression


Oxycodone/Acetaminophen (Percocet 325-5 Mg)  1 tab PO Q4H PRN


   PRN Reason: Pain (moderate 4-6)


Oxycodone/Acetaminophen (Percocet 325-5 Mg)  2 tab PO Q4H PRN


   PRN Reason: Pain (severe 7-10)


   Last Admin: 20 11:55 Dose:  2 tab


Simethicone (Simethicone)  160 mg PO Q6H PRN


   PRN Reason: gas pain


   Last Admin: 20 08:01 Dose:  160 mg





Discontinued Medications





Bupivacaine HCl (Marcaine 0.5%) Confirm Administered Dose 30 ml .ROUTE .STK-MED 

ONE


   Stop: 20 07:15


   Last Admin: 20 08:02 Dose:  20 ml


Cefazolin Sodium (Ancef) Confirm Administered Dose 2 gm .ROUTE .STK-MED ONE


   Stop: 20 07:17


Citric Acid/Sodium Citrate (Bicitra Solution)  30 ml PO ONETIME ONE


   Stop: 20 06:01


   Last Admin: 20 06:36 Dose:  30 ml


Diphenhydramine HCl (Benadryl)  25 mg IVPUSH Q6H PRN


   PRN Reason: Pruritis


Fentanyl (Sublimaze)  50 mcg IVPUSH Q5M PRN


   PRN Reason: Pain


Cefazolin Sodium/Dextrose 2 gm (/ Premix)  50 mls @ 100 mls/hr IV ONETIME ONE


   Stop: 20 06:59


   Last Admin: 20 09:53 Dose:  Not Given


Lactated Ringer's (Ringers, Lactated)  1,000 mls @ 125 mls/hr IV ASDIRECTED DAGO


   Last Admin: 20 06:46 Dose:  125 mls/hr


Oxytocin/Lactated Ringer's (Pitocin In Lr 10 Units/1,000 Ml)  10 unit in 1,000 

mls @ 100 mls/hr IV ASDIRECTED DAGO; Protocol


Lactated Ringer's (Ringers, Lactated) Confirm Administered Dose 2,000 mls @ as 

directed .ROUTE .STK-MED ONE


   Stop: 20 07:17


Dextrose/Lactated Ringer's (Dextrose 5%-Lactated Ringers)  1,000 mls @ 125 mls/

hr IV ASDIRECTED DAGO


   Stop: 20 17:49


   Last Admin: 20 10:04 Dose:  125 mls/hr


Ketorolac Tromethamine (Toradol) Confirm Administered Dose 30 mg .ROUTE .STK-

MED ONE


   Stop: 20 07:17


Metoclopramide HCl (Reglan)  10 mg IVPUSH ONETIME ONE


   Stop: 20 06:01


   Last Admin: 20 06:37 Dose:  10 mg


Miscellaneous Medication (Phenylephrine 1 Mg/10 Ml-Ns) Confirm Administered 

Dose 1 mg IV .STK-MED ONE


   Stop: 20 07:53


Morphine Sulfate (Duramorph Pf) Confirm Administered Dose 1 mg .ROUTE .STK-MED 

ONE


   Stop: 20 07:18


Ondansetron HCl (Zofran) Confirm Administered Dose 4 mg .ROUTE .STK-MED ONE


   Stop: 20 07:17


Ondansetron HCl (Zofran)  4 mg IVPUSH ONETIME PRN


   PRN Reason: Nausea/Vomiting


Oxytocin (Pitocin) Confirm Administered Dose 20 unit .ROUTE .STK-MED ONE


   Stop: 20 07:17


Sodium Chloride (Saline Flush)  10 ml FLUSH ASDIRECTED PRN


   PRN Reason: Keep Vein Open











- Exam


General: Reports: Alert, Oriented


HEENT: Reports: Pupils Equal, Pupils Reactive, EOMI, Mucous Membr. Moist/Pink


Neck: Reports: Supple


Lungs: Reports: Clear to Auscultation, Normal Respiratory Effort


Cardiovascular: Reports: Regular Rate, Regular Rhythm


GI/Abdominal Exam: Normal Bowel Sounds, Soft, Non-Tender, No Organomegaly, No 

Distention, No Abnormal Bruit, No Mass, Pelvis Stable


Back Exam: Reports: Normal Inspection, Full Range of Motion


Extremities: Normal Inspection, Normal Range of Motion, Non-Tender, No Pedal 

Edema, Normal Capillary Refill


Skin: Reports: Warm, Dry, Intact


Wound/Incisions: Reports: Healing Well


Neurological: Reports: No New Focal Deficit


Psy/Mental Status: Reports: Alert, Normal Affect, Normal Mood

## 2020-03-18 NOTE — [id number]
Post Anesthesia Note





- EVALUATION WITHIN 48HRS OF ANESTHETIC


Vital Signs in Normal Range: Yes


Patient Participated in Evaluation: Yes


Respiratory Function Stable: Yes


Airway Patent: Yes


Cardiovascular Function Stable: Yes


Hydration Status Stable: Yes


Pain Control Satisfactory: Yes


Nausea and Vomiting Control Satisfactory: Yes


Mental Status Recovered: Yes


Vital Signs: 


 Last Vital Signs











Temp  37.1 C   03/18/20 07:52


 


Pulse  68   03/18/20 07:52


 


Resp  16   03/18/20 07:52


 


BP  89/54 L  03/18/20 07:52


 


Pulse Ox  99   03/18/20 07:52














- COMMENTS/OBSERVATIONS


Free Text/Narrative:: 





Patient continues to c/o right shoulder pain, warm K-pad noted, and patient 

states pain is tolerable and improves with medication.